# Patient Record
Sex: FEMALE | Race: WHITE | NOT HISPANIC OR LATINO | URBAN - METROPOLITAN AREA
[De-identification: names, ages, dates, MRNs, and addresses within clinical notes are randomized per-mention and may not be internally consistent; named-entity substitution may affect disease eponyms.]

---

## 2019-04-04 ENCOUNTER — EMERGENCY (EMERGENCY)
Facility: HOSPITAL | Age: 24
LOS: 1 days | Discharge: HOME | End: 2019-04-04
Attending: EMERGENCY MEDICINE | Admitting: EMERGENCY MEDICINE
Payer: COMMERCIAL

## 2019-04-04 VITALS
DIASTOLIC BLOOD PRESSURE: 71 MMHG | OXYGEN SATURATION: 98 % | TEMPERATURE: 98 F | SYSTOLIC BLOOD PRESSURE: 124 MMHG | HEART RATE: 82 BPM | RESPIRATION RATE: 18 BRPM | WEIGHT: 119.93 LBS

## 2019-04-04 VITALS
OXYGEN SATURATION: 99 % | HEART RATE: 93 BPM | RESPIRATION RATE: 18 BRPM | SYSTOLIC BLOOD PRESSURE: 123 MMHG | DIASTOLIC BLOOD PRESSURE: 73 MMHG | TEMPERATURE: 100 F
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
## 2019-04-04 DIAGNOSIS — Z91.018 ALLERGY TO OTHER FOODS: ICD-10-CM

## 2019-04-04 DIAGNOSIS — H57.89 OTHER SPECIFIED DISORDERS OF EYE AND ADNEXA: ICD-10-CM

## 2019-04-04 DIAGNOSIS — T78.1XXA OTHER ADVERSE FOOD REACTIONS, NOT ELSEWHERE CLASSIFIED, INITIAL ENCOUNTER: ICD-10-CM
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
## 2019-04-04 DIAGNOSIS — J39.2 OTHER DISEASES OF PHARYNX: ICD-10-CM

## 2019-04-04 PROCEDURE — 99284 EMERGENCY DEPT VISIT MOD MDM: CPT

## 2019-04-04 PROCEDURE — 96374 THER/PROPH/DIAG INJ IV PUSH: CPT

## 2019-04-04 PROCEDURE — 99284 EMERGENCY DEPT VISIT MOD MDM: CPT | Mod: 25

## 2019-04-04 RX ORDER — HYDROXYZINE HCL 10 MG
2 TABLET ORAL
Qty: 30 | Refills: 0 | OUTPATIENT
Start: 2019-04-04 | End: 2019-04-08

## 2019-04-04 RX ORDER — FAMOTIDINE 10 MG/ML
20 INJECTION INTRAVENOUS ONCE
Qty: 0 | Refills: 0 | Status: COMPLETED | OUTPATIENT
Start: 2019-04-04 | End: 2019-04-04

## 2019-04-04 RX ORDER — EPINEPHRINE 0.3 MG/.3ML
0.3 INJECTION INTRAMUSCULAR; SUBCUTANEOUS
Qty: 1 | Refills: 0 | OUTPATIENT
Start: 2019-04-04 | End: 2019-04-04

## 2019-04-04 RX ORDER — FAMOTIDINE 10 MG/ML
1 INJECTION INTRAVENOUS
Qty: 10 | Refills: 0 | OUTPATIENT
Start: 2019-04-04 | End: 2019-04-08

## 2019-04-04 RX ORDER — SODIUM CHLORIDE 9 MG/ML
2000 INJECTION INTRAMUSCULAR; INTRAVENOUS; SUBCUTANEOUS ONCE
Qty: 0 | Refills: 0 | Status: COMPLETED | OUTPATIENT
Start: 2019-04-04 | End: 2019-04-04

## 2019-04-04 RX ADMIN — SODIUM CHLORIDE 2000 MILLILITER(S): 9 INJECTION INTRAMUSCULAR; INTRAVENOUS; SUBCUTANEOUS at 17:04

## 2019-04-04 RX ADMIN — FAMOTIDINE 20 MILLIGRAM(S): 10 INJECTION INTRAVENOUS at 17:04

## 2019-04-04 NOTE — ED PROVIDER NOTE - CLINICAL SUMMARY MEDICAL DECISION MAKING FREE TEXT BOX
pt accidently ingested nuts, used her epipen around 1:30pm and then given iv benadryl/solumedrol by ems, upon arrival to ed pt w/resolving hives and resolving eyelid swelling, no airway compromise, no stridor no resp distress, pt given ivf and h2 blocker, observed for few hrs and no recurrence of allergic reaction, will dc w/short course of steroids / h2 and h1 blocker, prn epipen, pt understands and agrees w/plan

## 2019-04-04 NOTE — ED ADULT NURSE NOTE - OBJECTIVE STATEMENT
Patient BIBA AOX4 c/o of allergic reaction--given benadryl/epi pen/steroids in field--reports improvement in symptoms. Speaking in full, complete sentences. Resps even and non labored. Denies CP/SOB/throat swelling/tongue swelling/lip swelling. Lung fields clear on ausc.

## 2019-04-04 NOTE — ED ADULT NURSE NOTE - CHPI ED NUR SYMPTOMS NEG
no congestion/no wheezing/no throat itching/no vomiting/no shortness of breath/no nausea/no rash/no swelling of face, tongue/no difficulty breathing/no difficulty swallowing

## 2019-04-04 NOTE — ED ADULT TRIAGE NOTE - OTHER COMPLAINTS
REports urticaria and eye swelling. PT reports administered Epi pen. received 125 solumedrol and 50mg iv benadryl. PT denies throat itching nor sob. airway patent. 20g IV to [facility] ON telemetry.

## 2019-04-04 NOTE — ED PROVIDER NOTE - ENMT, MLM
Airway patent, Nasal mucosa clear. Mouth with normal mucosa. Throat has no vesicles, no oropharyngeal exudates and uvula is midline. no lesions to mucosa, no stridor

## 2019-04-04 NOTE — ED PROVIDER NOTE - ATTENDING CONTRIBUTION TO CARE
Patient in ED 2/2 concern for allergic rxn to cashews today.  She notes she was covered in hives after ingestion and used epi autoinjector then went to .  She was given solu-medrol, benadryl and EMS transported to ED.  On my face to face ED eval, patient is non toxic in appearance.  HRRR, lungs clear.  Abd soft.  Posterior oropharynx clear, + mild left sided blepharitis.  + Urticaria, diffuse (improving).  Will monitor in dispo accordingly.

## 2019-04-04 NOTE — ED PROVIDER NOTE - PROGRESS NOTE DETAILS
pt's iv infiltrated - site swollen and tender, ivf turned off by me, rn notified and asked to replace pt feeling much better, hives resolved, L eyelid swelling resolved

## 2019-04-04 NOTE — ED PROVIDER NOTE - NSFOLLOWUPINSTRUCTIONS_ED_ALL_ED_FT
drink lots of water, you may take atarax as needed for itching or hives, pepcid twice a day, prednisone once a day starting tomorrow for next 4 days    Food Allergy  ImageA food allergy is when your body reacts to a food in a way that is not normal. The reaction can be gentle or very bad.  Signs of a Gentle Reaction   Stuffy nose.  Tingling in the mouth.  An itchy, red rash.  Throwing up (vomiting).  Watery poop (diarrhea).  Signs of a Very Bad Reaction   Puffiness (swelling). This may be on the lips, face, or tongue, or in the mouth or throat.  Breathing loudly (wheezing).  A hoarse voice.  Itchy, red, swollen areas of skin (hives).  Dizziness or light-headedness.  Fainting.  Trouble breathing or swallowing.  A tight feeling in the chest.  A very fast heartbeat.  Follow these instructions at home:  General instructions   Avoid the foods that you are allergic to.  Read food labels. Look for ingredients that you are allergic to.  When you are at a restaurant, tell your  that you have an allergy. Ask if your meal has an ingredient that you are allergic to.  Take medicines only as told by your doctor. Do not drive until the medicine has worn off, unless your doctor says it is okay.  Tell all people who care for you that you have a food allergy. This includes your doctor and dentist.  If you think that you might be allergic to something else, talk with your doctor. Do not eat a food to see if you are allergic to it without talking with your doctor first.  If you have a very bad allergy:  Wear a bracelet or necklace that says you have an allergy.  Carry your allergy kit (anaphylaxis kit) or an allergy shot (epinephrine injection) with you all the time. Use them as told by your doctor.  Make sure that you, your family, and your boss know  How to use your allergy kit.  How to give you an allergy shot.  If you use the medicine epinephrine:  Get more right away in case you have another reaction.  Get help. You can have a life-threatening reaction after taking the medicine.  If you are being tested for an allergy   Follow a diet as told by your doctor.  Keep a food diary as told by your doctor. Every day, write down:  What you eat and drink and when.  What problems you have and when.  Contact a doctor if:  The signs of your reaction have not gone away within 2 days.  The signs of your reaction get worse.  You have new signs of a reaction.  Get help right away if:  You use the medicine epinephrine.  You are having a very bad reaction. Signs of a very bad reaction are:  Puffiness. This may be on the lips, face, or tongue, or in the mouth or throat.  Breathing loudly.  A hoarse voice.  Itchy, red swollen areas of skin.  Dizziness or light-headedness.  Fainting.  Trouble breathing or swallowing.  A tight feeling in the chest.  A very fast heartbeat.

## 2019-04-04 NOTE — ED PROVIDER NOTE - OBJECTIVE STATEMENT
The pt is a 25 y/o F, BIBA, for allergic reaction - was eating a fruit bowl and did not realize that it had cashews in it at 1 pm, started to feel like her throat was scratchy, L eye got swollen, broke out in hives, used her epipen and symptoms resolved, then was given solumedrol and benadryl by EMS. Denies n/v/d, abd pain, cp, sob, fevers, chills, dysphagia, dyspnea

## 2024-09-07 NOTE — ED ADULT NURSE NOTE - NS ED NURSE IV DC DT
propranolol 10 mg oral tablet: 0.25 tab(s) orally every 6 hours as needed for  tachycardia   04-Apr-2019 19:02